# Patient Record
(demographics unavailable — no encounter records)

---

## 2024-11-06 NOTE — PHYSICAL EXAM
Addended by: LE MARIN on: 11/26/2023 02:54 PM     Modules accepted: Orders, Level of Service     Reason for Call:  Medication or medication refill:Refill Medication     Do you use a [facility]?  Name of the pharmacy and phone number for the current request:  [facility] in [place]     Name of the medication requested: methadone (DOLOPHINE) 5 MG tablet, oxyCODONE-acetaminophen (PERCOCET) 7.5-325 MG per tablet  Other request: Also Tregabalin send to pharmacy [facility] in [facility]     Can we leave a detailed message on this number? YES    Phone number patient can be reached at: Cell number on file:    Telephone Information:   Mobile [phone]       Best Time: Any    Call taken on 9/16/2019 at 3:21 PM by Virgie Dudley       [No Acute Distress] : no acute distress [Normal Sclera/Conjunctiva] : normal sclera/conjunctiva [Normal Outer Ear/Nose] : the outer ears and nose were normal in appearance [Normal] : affect was normal and insight and judgment were intact

## 2024-11-06 NOTE — HISTORY OF PRESENT ILLNESS
[Home] : at home, [unfilled] , at the time of the visit. [Medical Office: ([facility])___] : at the medical office located in  [Verbal consent obtained from patient] : the patient, [unfilled] [FreeTextEntry8] : 33 y/o female presents for two issues.  1. Has L gluteal pain.  suggested PT. No radiation. Uses heat frequently but hasn't tried NSAIDs.   2. Started Metformin 3 months ago but never took the second dose. COuldn't remember. Then stopped totally a few weeks ago because she was on vacation and forgot it. Wants to discuss options.

## 2024-11-06 NOTE — ASSESSMENT
[FreeTextEntry1] : 1. Pt with piriformis syndrome-start PT. NSAIDs advisewd. Continue with heat. 2. I would have her restart Metformin. She is not particularly overweight such that a GLP-1 agonist is indicated. We discussed wellbutrin but she is already on Prozac and is hesitant at this time.

## 2025-02-26 NOTE — ASSESSMENT
[FreeTextEntry1] : 1. Ritalin refilled. Needs to keep close eye on BP as it's currently borderline. [place]. 2. HAs likely due to invislign vs Prozac withdrawl. Tryptan script sent.  3. Mobic or Advil for piriformis syndrome pain. Advised stretching/foam roll.

## 2025-02-26 NOTE — HISTORY OF PRESENT ILLNESS
[Home] : at home, [unfilled] , at the time of the visit. [Medical Office: ([facility])___] : at the medical office located in  [Telehealth (audio & video)] : This visit was provided via telehealth using real-time 2-way audio visual technology. [Verbal consent obtained from patient] : the patient, [unfilled] [de-identified] : multiple issues.  1. Wants Ritlain refill. Hasn't had in a while due to shortage but wants to try again. 2. Off Prozac. "Couldn't stand the weight gain".  3. Having more frequnet HAs. Her Neurologist had increased the dose of sumitryptan to 50 mg but won't refill without a visit. Doing invisilign which she thinks is the HA trigger. 4. Did PT for piriformis syndrome which helped a lot but new dedicuitble is high and doesn't want to pay OOP. Wants NSAIDs script.

## 2025-02-26 NOTE — HEALTH RISK ASSESSMENT
[0] : 2) Feeling down, depressed, or hopeless: Not at all (0) [PHQ-2 Negative - No further assessment needed] : PHQ-2 Negative - No further assessment needed [Never] : Never [[id number]] : 0

## 2025-02-26 NOTE — REVIEW OF SYSTEMS
[Headache] : headache [Negative] : Heme/Lymph [Muscle Pain] : muscle pain [Dizziness] : no dizziness [Memory Loss] : no memory loss [Unsteady Walking] : no ataxia

## 2025-03-17 NOTE — CONSULT LETTER
[Dear  ___] : Dear  [unfilled], [Consult Letter:] : I had the pleasure of evaluating your patient, [unfilled]. [Please see my note below.] : Please see my note below. [Consult Closing:] : Thank you very much for allowing me to participate in the care of this patient.  If you have any questions, please do not hesitate to contact me. [Sincerely,] : Sincerely, [FreeTextEntry3] : Jesus Vazquez M.D., FAAOS Co-Director [facility]

## 2025-03-17 NOTE — ASSESSMENT
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  The risks, benefits, alternatives and associated differential diagnosis was discussed with the patient. Options ranged from conservative care, therapy to surgical intervention were reviewed and all questions answered. Risks included incomplete resolution of symptoms, worsening of symptoms recurrence, tissue loss, functional loss and other risks associated with treatment of this condition Patient appeared to have an excellent understanding of the risks as well as differential diagnosis associated with this condition.  Elected to proceed with a splint for the left third MP radial sagittal band tear  Return to the office in 4 weeks

## 2025-03-17 NOTE — PHYSICAL EXAM
[de-identified] : Physical exam shows the patient to be alert and oriented x3, capable of ambulation. The patient is well-developed and well-nourished in no apparent respiratory distress. Majority of the skin is intact bilaterally in the upper extremities without lymphadenopathy at the elbows.  The wrists have a symmetric range of motion bilaterally. There is no tenderness over the scaphoid, scapholunate or lunotriquetral ligaments bilaterally. There is a negative Camara test bilaterally. There is negative tenderness over the radial ulnar joint or TFCC and no evidence of instability bilaterally. No tenderness over the pisotriquetral or hamate hook or CMC joint bilaterally. There is 5 over 5 strength of the wrists bilaterally.  There is swelling ecchymosis and tenderness localized over the radial sagittal band of the left third MP joint.  The extensor mechanism dislocates upon flexion and ulnar deviation.  There is no instability of the radial or ulnar collateral ligaments or volar plate.  FDS FDP is intact and no tenderness of the A1 pulley with full range of motion of the digit and active equaling passive range of motion.  There is good capillary refill of the digits bilaterally.There is no masses or sensitivity over the median and ulnar nerves at the level of the wrist. There is a negative Tinel's and negative Phalen's sign bilaterally. The sensation is grossly intact bilaterally. [de-identified] : PA lateral and oblique of the left hand shows no evidence of fractures or dislocations with joint spaces well-preserved no evidence of arthritis.  PA of both hands shows joint spaces symmetric

## 2025-03-17 NOTE — HISTORY OF PRESENT ILLNESS
[Right] : right hand dominant [FreeTextEntry1] : Date of onset: 3/13/2025 (4 days status post injury)  Patient here for initial evaluation of pain in the metacarpal joint of the left third digit. She denies any specific trauma to the hand. She also states that she moved her left third finger in an unusual way and felt a pop in the finger.  Patient noticed swelling and bruising of the metacarpal of the left third finger.  No medical treatment yet.

## 2025-04-02 NOTE — ASSESSMENT
[FreeTextEntry1] : She has no contraindication to starting a GLP-1 agonist although she is not obese. Letter provided to weight loss NP stating the above. Discussed risks/side effects vs benefits. Plan to do  for 2-3  months.

## 2025-04-02 NOTE — HISTORY OF PRESENT ILLNESS
[Home] : at home, [unfilled] , at the time of the visit. [Medical Office: ([facility])___] : at the medical office located in  [Telehealth (audio & video)] : This visit was provided via telehealth using real-time 2-way audio visual technology. [Verbal consent obtained from patient] : the patient, [unfilled] [de-identified] : Pt presents to discuss use of GLP-1 agonist for weight loss. Pt with slightly elevated BMI (26). Stopped Metfromin as it didn't work. She already takes stimulant for ADHD.  Saw a weight loss specialist who recommended a compounded tirazepitide. SHe wants to consult with me first.

## 2025-04-21 NOTE — ASSESSMENT
[FreeTextEntry1] : 5 weeks status post MP extension splinting to help stabilize an unstable radial sagittal band tear The stability of the extensor mechanism appears to be reestablished.  A home program was outlined which included weaning from the hard splint to a brandt strap  Return to the office in 4 weeks earlier if signs or symptoms of instability recur.

## 2025-04-21 NOTE — HISTORY OF PRESENT ILLNESS
[Right] : right hand dominant [FreeTextEntry1] : Date of onset: 3/13/2025  Patient is 5.5 weeks status post left third MP radial sagittal band rupture with instability extensor mechanism which has been maintained in a splint for the past 4 weeks.

## 2025-04-21 NOTE — PHYSICAL EXAM
[de-identified] : Skin is intact there is no evidence of infection.  There is some residual swelling of the MP joint of the left third digit.  Range of motion of the MP joint of the left third digit is 0/65 is compared to 0/90 of the opposite digits.  There is no instability to collateral ligaments or extensor mechanism which appears centralized even with maximum flexion. Full range of motion of the PIP and DIP joint. Good capillary refill negative Tinel's sensation grossly intact

## 2025-05-22 NOTE — HISTORY OF PRESENT ILLNESS
[Right] : right hand dominant [FreeTextEntry1] : Date of onset: 3/13/2025  Patient is 9 weeks status post left third MP radial sagittal band rupture with instability extensor mechanism which has been maintained in a splint for the past 4 weeks and then transitioned to brandt strap and home program.